# Patient Record
Sex: MALE | URBAN - METROPOLITAN AREA
[De-identification: names, ages, dates, MRNs, and addresses within clinical notes are randomized per-mention and may not be internally consistent; named-entity substitution may affect disease eponyms.]

---

## 2017-07-27 PROCEDURE — 75810000275 HC EMERGENCY DEPT VISIT NO LEVEL OF CARE: Performed by: EMERGENCY MEDICINE

## 2017-07-28 ENCOUNTER — HOSPITAL ENCOUNTER (EMERGENCY)
Age: 27
Discharge: LEFT BEFORE DISCHARGE | End: 2017-07-28
Attending: EMERGENCY MEDICINE

## 2025-03-05 NOTE — ED TRIAGE NOTES
Patient arrived via EMS, patient alert and oriented. Upon arrival patient walked from registration area to back bathroom. Patient stated that he just needed to use the restroom. Security called. Once security arrived bathroom door was opened to find patient sitting on toilet with pants still around his waist. Patient would not stand up off of the toilet. security assisted patient up and to the triage area and into a wheelchair. [employer] called. Patient kept stating that he needed to use the bathroom, that he wasn't ready for our help yet. [address] arrived and asked patient why he would not let us help him. Patient stated that he just wasn't ready. Patient stated to the officer that he wasn't going to leave and wasn't ready for our help. This RN then asked the patient if he needed our help patient stated that he did not, patient then asked if he was going leave and he stated that he would not. [address] escorted patient off property.
PAST SURGICAL HISTORY:  History of percutaneous coronary intervention